# Patient Record
Sex: MALE | Race: AMERICAN INDIAN OR ALASKA NATIVE
[De-identification: names, ages, dates, MRNs, and addresses within clinical notes are randomized per-mention and may not be internally consistent; named-entity substitution may affect disease eponyms.]

---

## 2018-05-09 ENCOUNTER — HOSPITAL ENCOUNTER (EMERGENCY)
Dept: HOSPITAL 5 - ED | Age: 58
Discharge: HOME | End: 2018-05-09
Payer: SELF-PAY

## 2018-05-09 VITALS — DIASTOLIC BLOOD PRESSURE: 102 MMHG | SYSTOLIC BLOOD PRESSURE: 149 MMHG

## 2018-05-09 DIAGNOSIS — J30.9: Primary | ICD-10-CM

## 2018-05-09 DIAGNOSIS — F17.200: ICD-10-CM

## 2018-05-09 PROCEDURE — 99281 EMR DPT VST MAYX REQ PHY/QHP: CPT

## 2018-05-09 NOTE — EMERGENCY DEPARTMENT REPORT
- General


Chief Complaint: Upper Respiratory Infection


Stated Complaint: ALLERGIES


Time Seen by Provider: 05/09/18 14:52


Source: patient


Mode of arrival: Ambulatory


Limitations: No Limitations





- History of Present Illness


Initial Comments: 





58-year-old American male comes to the emergency room complaining of seasonal 

allergy symptoms.  Patient perceives has watery eyes sneezing itchy eyes and 

nasal congestion and intermittent headaches.  Patient denies any fever chills 

no nausea no vomiting or abdominal pain no chest pain.  Patient presses tried 

over-the-counter medications without resolution.  Patient recently moved here 

from [place] and has been complaining of symptoms since Thursday.  Patient 

has no past medical history currently takes no medications on a daily basis and 

have no known drug allergies.


MD Complaint: rhinorrhea, nasal congestion


Improves With: nothing


Worsens With: nothing


Context: recent travel


Associated Symptoms: denies other symptoms


Treatments Prior to Arrival: other (OTC allergy medicine)





- Related Data


 Previous Rx's











 Medication  Instructions  Recorded  Last Taken  Type


 


Ketotifen Fumarate [Zaditor] 1 drop OP QDAY #1 bottle 05/09/18 Unknown Rx


 


Loratadine [Claritin] 10 mg PO DAILY #30 tablet 05/09/18 Unknown Rx


 


Triamcinolone Acetonide [Nasacort 16.9 ml NS QDAY #1 bottle 05/09/18 Unknown Rx





SPRAY]    











 Allergies











Allergy/AdvReac Type Severity Reaction Status Date / Time


 


No Known Allergies Allergy   Unverified 05/09/18 13:18














ED Review of Systems


ROS: 


Stated complaint: ALLERGIES


Other details as noted in HPI





Constitutional: denies: chills, fever


Eyes: other (watery and itchy eyes)


ENT: congestion, other (rhinorrhea, sneezing)


Respiratory: cough (intermittent)


Cardiovascular: denies: chest pain, palpitations


Skin: denies: rash, lesions


Neurological: headache (intermittent)


Psychiatric: denies: anxiety, depression


Hematological/Lymphatic: denies: easy bleeding, easy bruising





ED Past Medical Hx





- Past Medical History


Previous Medical History?: No





- Surgical History


Past Surgical History?: No





- Social History


Smoking Status: Current Every Day Smoker


Substance Use Type: None





- Medications


Home Medications: 


 Home Medications











 Medication  Instructions  Recorded  Confirmed  Last Taken  Type


 


Ketotifen Fumarate [Zaditor] 1 drop OP QDAY #1 bottle 05/09/18  Unknown Rx


 


Loratadine [Claritin] 10 mg PO DAILY #30 tablet 05/09/18  Unknown Rx


 


Triamcinolone Acetonide [Nasacort 16.9 ml NS QDAY #1 bottle 05/09/18  Unknown Rx





SPRAY]     














ED Physical Exam





- General


Limitations: No Limitations





- Eye


Eye exam: Present: conjunctival injection





- ENT


ENT exam: Present: mucous membranes moist





- Neck


Neck exam: Present: normal inspection





- Respiratory


Respiratory exam: Present: normal lung sounds bilaterally.  Absent: respiratory 

distress





- Cardiovascular


Cardiovascular Exam: Present: regular rate, normal rhythm.  Absent: systolic 

murmur, diastolic murmur, rubs, gallop





- GI/Abdominal


GI/Abdominal exam: Present: soft, normal bowel sounds





- Back Exam


Back exam: Present: normal inspection, full ROM





- Neurological Exam


Neurological exam: Present: alert, oriented X3





- Psychiatric


Psychiatric exam: Present: normal affect, normal mood





ED Course





 Vital Signs











  05/09/18





  13:14


 


Temperature 98.8 F


 


Pulse Rate 94 H


 


Respiratory 18





Rate 


 


Blood Pressure 149/102


 


O2 Sat by Pulse 97





Oximetry 














ED Medical Decision Making





- Medical Decision Making





Patient's been evaluated for this provider fast track.  I discussed the patient 

is appears to be allergic rhinitis.  I will place patient on Claritin H2-blocker

, Zaditor for allergy this is a mass cell stabilizer.  And Nasacort which is a 

steroid nasal spray.  Discussed the patient has symptoms persists or gets worse 

she should follow-up with a primary care provider I have listed wanted his 

discharge noticed.


Critical care attestation.: 


If time is entered above; I have spent that time in minutes in the direct care 

of this critically ill patient, excluding procedure time.








ED Disposition


Clinical Impression: 


Allergic rhinitis


Qualifiers:


 Allergic rhinitis trigger: pollen Allergic rhinitis seasonality: seasonal 

Qualified Code(s): J30.1 - Allergic rhinitis due to pollen





Disposition: DC-01 TO HOME OR SELFCARE


Is pt being admited?: No


Does the pt Need Aspirin: No


Condition: Stable


Instructions:  Allergic Rhinitis (ED)


Additional Instructions: 


Please take medication as prescribed.  If symptoms persist or gets worse with 

follow-up to primary care provider.


Prescriptions: 


Ketotifen Fumarate [Zaditor] 1 drop OP QDAY #1 bottle


Loratadine [Claritin] 10 mg PO DAILY #30 tablet


Triamcinolone Acetonide [Nasacort SPRAY] 16.9 ml NS QDAY #1 bottle


Referrals: 


[facility] [Provider Group] - 3-5 Days


Forms:  Work/School Release Form(ED)